# Patient Record
Sex: MALE | Race: WHITE | NOT HISPANIC OR LATINO | URBAN - METROPOLITAN AREA
[De-identification: names, ages, dates, MRNs, and addresses within clinical notes are randomized per-mention and may not be internally consistent; named-entity substitution may affect disease eponyms.]

---

## 2019-12-05 ENCOUNTER — APPOINTMENT (OUTPATIENT)
Dept: RADIOLOGY | Facility: CLINIC | Age: 34
End: 2019-12-05
Attending: FAMILY MEDICINE

## 2019-12-05 ENCOUNTER — OFFICE VISIT (OUTPATIENT)
Dept: URGENT CARE | Facility: CLINIC | Age: 34
End: 2019-12-05

## 2019-12-05 VITALS
RESPIRATION RATE: 18 BRPM | DIASTOLIC BLOOD PRESSURE: 74 MMHG | HEART RATE: 78 BPM | HEIGHT: 75 IN | SYSTOLIC BLOOD PRESSURE: 112 MMHG | WEIGHT: 260 LBS | TEMPERATURE: 99.2 F | BODY MASS INDEX: 32.33 KG/M2 | OXYGEN SATURATION: 98 %

## 2019-12-05 DIAGNOSIS — J18.9 COMMUNITY ACQUIRED PNEUMONIA OF LEFT LOWER LOBE OF LUNG: Primary | ICD-10-CM

## 2019-12-05 DIAGNOSIS — R06.02 SHORTNESS OF BREATH: ICD-10-CM

## 2019-12-05 PROCEDURE — 99203 OFFICE O/P NEW LOW 30 MIN: CPT | Performed by: FAMILY MEDICINE

## 2019-12-05 PROCEDURE — 71046 X-RAY EXAM CHEST 2 VIEWS: CPT

## 2019-12-05 RX ORDER — BENZONATATE 200 MG/1
200 CAPSULE ORAL 3 TIMES DAILY PRN
Qty: 20 CAPSULE | Refills: 0 | Status: SHIPPED | OUTPATIENT
Start: 2019-12-05

## 2019-12-05 RX ORDER — AZITHROMYCIN 250 MG/1
TABLET, FILM COATED ORAL
Qty: 6 TABLET | Refills: 0 | Status: SHIPPED | OUTPATIENT
Start: 2019-12-05 | End: 2019-12-09

## 2019-12-05 RX ORDER — ALBUTEROL SULFATE 90 UG/1
2 AEROSOL, METERED RESPIRATORY (INHALATION) EVERY 6 HOURS PRN
Qty: 18 G | Refills: 0 | Status: SHIPPED | OUTPATIENT
Start: 2019-12-05

## 2019-12-05 NOTE — PROGRESS NOTES
[address]        NAME: Nahid Gaspar is a 30 y o  male  : 1985    MRN: [id number]  DATE: 2019  TIME: 2:37 PM    Assessment and Plan   Community acquired pneumonia of left lower lobe of lung ([facility] [address] ) [J18 1]  1  Community acquired pneumonia of left lower lobe of lung ([facility] [address] )  XR chest pa & lateral    azithromycin (ZITHROMAX) 250 mg tablet    benzonatate (TESSALON) 200 MG capsule    albuterol (VENTOLIN HFA) 90 mcg/act inhaler         Patient Instructions     Patient Instructions   1  Community Acquired Pneumonia  - CXR shows findings of an infiltrate in the left lower lobe  - Zithromax x 5 days prescribed, complete as directed   - Tessalon pearls prescribed to help w/ cough   - Ventolin inhaler prescribed to help w/ wheezing  - rest and drink plenty of fluids  - take Tylenol or Motrin as needed   - try warm salt water gargles and throat lozenges as needed   - run a humidifier at home  - follow up w/ pcp for re-check in 3-5 days  - if symptoms persist despite treatment, worsen, or any new symptoms present, should be seen in the ER     Follow up with PCP in 3-5 days  Proceed to  ER if symptoms worsen  Chief Complaint     Chief Complaint   Patient presents with    Cold Like Symptoms     cough, fever, GI symptoms         History of Present Illness       29 yo male presents c/o fever of 103 last night, productive cough and chest congestion  He has associated chills and body aches  He states he has been ill for a total of 10 days  He states his symptoms first began with nausea/vomiting and diarrhea, those symptoms are now resolved  However for the past week he has been experiencing a sore throat with the cough  He states his first fever was 101 two days ago then 103 last night  He felt feverish this morning, but did not check his temperature  He denies any chest pain or SOB, but has noted wheezing  Patient is not a smoker  No skin rashes  No recent travel or known sick contacts   He did not get the flu shot this year  He has been taking OTC cold medications with no improvement  Review of Systems   Review of Systems   Constitutional:        As noted in HPI   HENT:        As noted in HPI   Eyes: Negative  Respiratory:        As noted in HPI   Cardiovascular: Negative  Gastrointestinal: Negative  Genitourinary: Negative  Musculoskeletal: Negative  Skin: Negative  Neurological: Negative  Current Medications       Current Outpatient Medications:     albuterol (VENTOLIN HFA) 90 mcg/act inhaler, Inhale 2 puffs every 6 (six) hours as needed for wheezing, Disp: 18 g, Rfl: 0    azithromycin (ZITHROMAX) 250 mg tablet, Take 2 tablets today then 1 tablet daily x 4 days, Disp: 6 tablet, Rfl: 0    benzonatate (TESSALON) 200 MG capsule, Take 1 capsule (200 mg total) by mouth 3 (three) times a day as needed for cough, Disp: 20 capsule, Rfl: 0    Current Allergies     Allergies as of 12/05/2019    (No Known Allergies)            The following portions of the patient's history were reviewed and updated as appropriate: allergies, current medications, past family history, past medical history, past social history, past surgical history and problem list      Past Medical History:   Diagnosis Date    Patient denies medical problems        Past Surgical History:   Procedure Laterality Date    FINGER SURGERY      pt has cut off  tips of finger and they got reattached    HIP SURGERY      right hip pins  Family History   Problem Relation Age of Onset    Kidney failure Father     Emphysema Father          Medications have been verified  Objective   /74 (BP Location: Right arm, Patient Position: Sitting, Cuff Size: Large)   Pulse 78   Temp 99 2 °F (37 3 °C) (Tympanic)   Resp 18   Ht 6' 3" (1 905 m)   Wt 118 kg (260 lb)   SpO2 98%   BMI 32 50 kg/m²        Physical Exam     Physical Exam   Constitutional: He is oriented to person, place, and time   Vital signs are normal  He appears well-developed and well-nourished  He is active and cooperative  Non-toxic appearance  He does not have a sickly appearance  He appears ill  No distress  HENT:   Head: Normocephalic and atraumatic  Right Ear: Tympanic membrane, external ear and ear canal normal    Left Ear: Tympanic membrane, external ear and ear canal normal    Nose: Nose normal    Mouth/Throat: Uvula is midline and mucous membranes are normal  Posterior oropharyngeal erythema present  No oropharyngeal exudate, posterior oropharyngeal edema or tonsillar abscesses  No tonsillar exudate  Eyes: Conjunctivae and EOM are normal    Neck: Normal range of motion  Neck supple  Cardiovascular: Normal rate, regular rhythm and normal heart sounds  Pulmonary/Chest: Effort normal  No accessory muscle usage  No tachypnea  No respiratory distress  Crackles in bilateral lower lobes  Mild expiratory wheezing  Abdominal: Soft  He exhibits no distension  There is no tenderness  Lymphadenopathy:     He has no cervical adenopathy  Neurological: He is alert and oriented to person, place, and time  Skin: He is not diaphoretic  Psychiatric: He has a normal mood and affect  His behavior is normal  Judgment and thought content normal    Nursing note and vitals reviewed

## 2019-12-05 NOTE — PATIENT INSTRUCTIONS
1  Community Acquired Pneumonia  - CXR shows findings of an infiltrate in the left lower lobe  - Zithromax x 5 days prescribed, complete as directed   - Tessalon pearls prescribed to help w/ cough   - Ventolin inhaler prescribed to help w/ wheezing  - rest and drink plenty of fluids  - take Tylenol or Motrin as needed   - try warm salt water gargles and throat lozenges as needed   - run a humidifier at home  - follow up w/ pcp for re-check in 3-5 days  - if symptoms persist despite treatment, worsen, or any new symptoms present, should be seen in the ER

## 2022-02-28 NOTE — TELEPHONE ENCOUNTER
Patient called to schedule an appt for a list assessment  physical   I reached out to [facility], unsure what that is    I LMOM to call back with more information so we can assist

## 2022-03-04 ENCOUNTER — APPOINTMENT (OUTPATIENT)
Dept: PHYSICAL THERAPY | Facility: CLINIC | Age: 37
End: 2022-03-04

## 2022-03-04 PROCEDURE — 97530 THERAPEUTIC ACTIVITIES: CPT
